# Patient Record
Sex: FEMALE | Race: WHITE | URBAN - METROPOLITAN AREA
[De-identification: names, ages, dates, MRNs, and addresses within clinical notes are randomized per-mention and may not be internally consistent; named-entity substitution may affect disease eponyms.]

---

## 2021-04-27 ENCOUNTER — HOSPITAL ENCOUNTER (OUTPATIENT)
Age: 56
Setting detail: SPECIMEN
Discharge: HOME | End: 2021-04-27
Payer: COMMERCIAL

## 2021-04-28 LAB
ALBUMIN SERPL-MCNC: 4.3 G/DL (ref 3.5–5.2)
ALBUMIN/GLOBULIN RATIO: 1.3 (ref 1–2.5)
ALP BLD-CCNC: 97 U/L (ref 35–104)
ALT SERPL-CCNC: 16 U/L (ref 5–33)
ANION GAP SERPL CALCULATED.3IONS-SCNC: 8 MMOL/L (ref 9–17)
AST SERPL-CCNC: 23 U/L
BILIRUB SERPL-MCNC: 0.68 MG/DL (ref 0.3–1.2)
BUN BLDV-MCNC: 12 MG/DL (ref 6–20)
BUN/CREAT BLD: ABNORMAL (ref 9–20)
CALCIUM SERPL-MCNC: 9.8 MG/DL (ref 8.6–10.4)
CHLORIDE BLD-SCNC: 100 MMOL/L (ref 98–107)
CHOLESTEROL/HDL RATIO: 4.2
CHOLESTEROL: 166 MG/DL
CO2: 27 MMOL/L (ref 20–31)
CREAT SERPL-MCNC: 0.58 MG/DL (ref 0.5–0.9)
GFR AFRICAN AMERICAN: >60 ML/MIN
GFR NON-AFRICAN AMERICAN: >60 ML/MIN
GFR SERPL CREATININE-BSD FRML MDRD: ABNORMAL ML/MIN/{1.73_M2}
GFR SERPL CREATININE-BSD FRML MDRD: ABNORMAL ML/MIN/{1.73_M2}
GLUCOSE BLD-MCNC: 90 MG/DL (ref 70–99)
HDLC SERPL-MCNC: 40 MG/DL
LDL CHOLESTEROL: 103 MG/DL (ref 0–130)
POTASSIUM SERPL-SCNC: 4.5 MMOL/L (ref 3.7–5.3)
SODIUM BLD-SCNC: 135 MMOL/L (ref 135–144)
TOTAL PROTEIN: 7.5 G/DL (ref 6.4–8.3)
TRIGL SERPL-MCNC: 115 MG/DL
VLDLC SERPL CALC-MCNC: ABNORMAL MG/DL (ref 1–30)

## 2022-12-08 ENCOUNTER — APPOINTMENT (OUTPATIENT)
Dept: GENERAL RADIOLOGY | Age: 57
End: 2022-12-08
Payer: COMMERCIAL

## 2022-12-08 ENCOUNTER — HOSPITAL ENCOUNTER (EMERGENCY)
Age: 57
Discharge: HOME | End: 2022-12-08
Attending: EMERGENCY MEDICINE
Payer: COMMERCIAL

## 2022-12-08 VITALS
HEART RATE: 90 BPM | DIASTOLIC BLOOD PRESSURE: 82 MMHG | RESPIRATION RATE: 16 BRPM | SYSTOLIC BLOOD PRESSURE: 156 MMHG | OXYGEN SATURATION: 96 % | WEIGHT: 159 LBS | TEMPERATURE: 99.5 F

## 2022-12-08 DIAGNOSIS — J06.9 UPPER RESPIRATORY TRACT INFECTION, UNSPECIFIED TYPE: ICD-10-CM

## 2022-12-08 DIAGNOSIS — R05.1 ACUTE COUGH: Primary | ICD-10-CM

## 2022-12-08 DIAGNOSIS — J10.1 INFLUENZA A: ICD-10-CM

## 2022-12-08 LAB
ANION GAP SERPL CALCULATED.3IONS-SCNC: 12 MEQ/L (ref 8–16)
BASOPHILS # BLD: 0.1 %
BASOPHILS ABSOLUTE: 0 THOU/MM3 (ref 0–0.1)
BUN BLDV-MCNC: 8 MG/DL (ref 7–22)
CALCIUM SERPL-MCNC: 9.9 MG/DL (ref 8.5–10.5)
CHLORIDE BLD-SCNC: 94 MEQ/L (ref 98–111)
CO2: 25 MEQ/L (ref 23–33)
CREAT SERPL-MCNC: 0.6 MG/DL (ref 0.4–1.2)
EOSINOPHIL # BLD: 0.1 %
EOSINOPHILS ABSOLUTE: 0 THOU/MM3 (ref 0–0.4)
ERYTHROCYTE [DISTWIDTH] IN BLOOD BY AUTOMATED COUNT: 11.9 % (ref 11.5–14.5)
ERYTHROCYTE [DISTWIDTH] IN BLOOD BY AUTOMATED COUNT: 39.4 FL (ref 35–45)
GFR SERPL CREATININE-BSD FRML MDRD: > 60 ML/MIN/1.73M2
GLUCOSE BLD-MCNC: 114 MG/DL (ref 70–108)
HCT VFR BLD CALC: 34.1 % (ref 37–47)
HEMOGLOBIN: 11.8 GM/DL (ref 12–16)
IMMATURE GRANS (ABS): 0.04 THOU/MM3 (ref 0–0.07)
IMMATURE GRANULOCYTES: 0.4 %
INFLUENZA A: DETECTED
INFLUENZA B: NOT DETECTED
LYMPHOCYTES # BLD: 7.2 %
LYMPHOCYTES ABSOLUTE: 0.8 THOU/MM3 (ref 1–4.8)
MCH RBC QN AUTO: 31.6 PG (ref 26–33)
MCHC RBC AUTO-ENTMCNC: 34.6 GM/DL (ref 32.2–35.5)
MCV RBC AUTO: 91.2 FL (ref 81–99)
MONOCYTES # BLD: 7.7 %
MONOCYTES ABSOLUTE: 0.8 THOU/MM3 (ref 0.4–1.3)
NUCLEATED RED BLOOD CELLS: 0 /100 WBC
PLATELET # BLD: 231 THOU/MM3 (ref 130–400)
PMV BLD AUTO: 10.9 FL (ref 9.4–12.4)
POTASSIUM SERPL-SCNC: 3.8 MEQ/L (ref 3.5–5.2)
PROCALCITONIN: 0.07 NG/ML (ref 0.01–0.09)
RBC # BLD: 3.74 MILL/MM3 (ref 4.2–5.4)
SARS-COV-2 RNA, RT PCR: NOT DETECTED
SEG NEUTROPHILS: 84.5 %
SEGMENTED NEUTROPHILS ABSOLUTE COUNT: 9.2 THOU/MM3 (ref 1.8–7.7)
SODIUM BLD-SCNC: 131 MEQ/L (ref 135–145)
WBC # BLD: 10.9 THOU/MM3 (ref 4.8–10.8)

## 2022-12-08 PROCEDURE — 87636 SARSCOV2 & INF A&B AMP PRB: CPT

## 2022-12-08 PROCEDURE — 85025 COMPLETE CBC W/AUTO DIFF WBC: CPT

## 2022-12-08 PROCEDURE — 36415 COLL VENOUS BLD VENIPUNCTURE: CPT

## 2022-12-08 PROCEDURE — 84145 PROCALCITONIN (PCT): CPT

## 2022-12-08 PROCEDURE — 99215 OFFICE O/P EST HI 40 MIN: CPT

## 2022-12-08 PROCEDURE — 80048 BASIC METABOLIC PNL TOTAL CA: CPT

## 2022-12-08 PROCEDURE — 71046 X-RAY EXAM CHEST 2 VIEWS: CPT

## 2022-12-08 PROCEDURE — 99283 EMERGENCY DEPT VISIT LOW MDM: CPT

## 2022-12-08 PROCEDURE — 6370000000 HC RX 637 (ALT 250 FOR IP): Performed by: STUDENT IN AN ORGANIZED HEALTH CARE EDUCATION/TRAINING PROGRAM

## 2022-12-08 PROCEDURE — 99203 OFFICE O/P NEW LOW 30 MIN: CPT | Performed by: NURSE PRACTITIONER

## 2022-12-08 RX ORDER — BENZONATATE 200 MG/1
200 CAPSULE ORAL 3 TIMES DAILY PRN
Qty: 60 CAPSULE | Refills: 0 | Status: SHIPPED | OUTPATIENT
Start: 2022-12-08

## 2022-12-08 RX ORDER — GUAIFENESIN AND DEXTROMETHORPHAN HYDROBROMIDE 1200; 60 MG/1; MG/1
TABLET, EXTENDED RELEASE ORAL
COMMUNITY
End: 2022-12-08 | Stop reason: SDUPTHER

## 2022-12-08 RX ORDER — LORATADINE 10 MG
1 CAPSULE ORAL 2 TIMES DAILY PRN
Qty: 60 CAPSULE | Refills: 0 | Status: SHIPPED | OUTPATIENT
Start: 2022-12-08

## 2022-12-08 RX ORDER — BENZONATATE 100 MG/1
200 CAPSULE ORAL ONCE
Status: COMPLETED | OUTPATIENT
Start: 2022-12-08 | End: 2022-12-08

## 2022-12-08 RX ORDER — LISINOPRIL AND HYDROCHLOROTHIAZIDE 25; 20 MG/1; MG/1
TABLET ORAL
COMMUNITY
Start: 2022-10-16

## 2022-12-08 RX ORDER — 0.9 % SODIUM CHLORIDE 0.9 %
500 INTRAVENOUS SOLUTION INTRAVENOUS ONCE
Status: DISCONTINUED | OUTPATIENT
Start: 2022-12-08 | End: 2022-12-08

## 2022-12-08 RX ORDER — ACETAMINOPHEN 500 MG
1000 TABLET ORAL ONCE
Status: COMPLETED | OUTPATIENT
Start: 2022-12-08 | End: 2022-12-08

## 2022-12-08 RX ADMIN — BENZONATATE 200 MG: 100 CAPSULE ORAL at 22:45

## 2022-12-08 RX ADMIN — ACETAMINOPHEN 1000 MG: 500 TABLET ORAL at 21:19

## 2022-12-08 ASSESSMENT — ENCOUNTER SYMPTOMS
RHINORRHEA: 1
SHORTNESS OF BREATH: 0
NAUSEA: 0
EYES NEGATIVE: 1
CHEST TIGHTNESS: 0
SORE THROAT: 0
DIARRHEA: 0
SINUS PRESSURE: 0
ABDOMINAL PAIN: 0
COUGH: 1
EYE REDNESS: 0
EYE ITCHING: 0
VOMITING: 0

## 2022-12-08 ASSESSMENT — PAIN SCALES - GENERAL: PAINLEVEL_OUTOF10: 7

## 2022-12-08 ASSESSMENT — PAIN DESCRIPTION - DESCRIPTORS: DESCRIPTORS: PRESSURE;SHARP

## 2022-12-08 ASSESSMENT — PAIN DESCRIPTION - LOCATION: LOCATION: HEAD

## 2022-12-08 ASSESSMENT — PAIN DESCRIPTION - PAIN TYPE: TYPE: ACUTE PAIN

## 2022-12-08 ASSESSMENT — PAIN - FUNCTIONAL ASSESSMENT
PAIN_FUNCTIONAL_ASSESSMENT: 0-10
PAIN_FUNCTIONAL_ASSESSMENT: PREVENTS OR INTERFERES SOME ACTIVE ACTIVITIES AND ADLS

## 2022-12-08 ASSESSMENT — PAIN DESCRIPTION - FREQUENCY: FREQUENCY: INTERMITTENT

## 2022-12-08 NOTE — ED TRIAGE NOTES
Pt to room 3 with c/o a harsh cough since before Thanksgiving. She reports she has tested for Covid and is negative but she c/o head pain with the cough.

## 2022-12-08 NOTE — ED PROVIDER NOTES
[address]       Chief Complaint   Patient presents with    Cough    Headache       Nurses Notes reviewed and I agree except as noted in the HPI. HISTORY OF PRESENT ILLNESS   Odin Glasgow is a 61 y.o. female who presents to the urgent care for evaluation of symptoms that started 11/23/22. Has a cough that just will not go away. She has taken \"bottles of cough medication. \"  She is coughing so hard her head hurts. Home COVID test negative. No chronic lung disease. The patient/patient representative has no other acute complaints at this time. REVIEW OF SYSTEMS     Review of Systems   Constitutional:  Negative for chills, fatigue and fever. HENT:  Negative for congestion, ear pain, sinus pressure and sore throat. Eyes:  Negative for redness and itching. Respiratory:  Positive for cough. Negative for chest tightness and shortness of breath. Cardiovascular:  Negative for chest pain. Gastrointestinal:  Negative for abdominal pain, diarrhea, nausea and vomiting. Skin:  Negative for rash. Allergic/Immunologic: Negative for environmental allergies and food allergies. Neurological:  Negative for headaches. Hematological:  Negative for adenopathy. PAST MEDICAL HISTORY         Diagnosis Date    Hypertension        SURGICAL HISTORY     Patient  has no past surgical history on file. CURRENT MEDICATIONS       Previous Medications    DEXTROMETHORPHAN-GUAIFENESIN (MUCINEX DM MAXIMUM STRENGTH)  MG TB12    Take by mouth    LISINOPRIL-HYDROCHLOROTHIAZIDE (PRINZIDE;ZESTORETIC) 20-25 MG PER TABLET    TAKE 1 TABLET BY MOUTH ONCE DAILY       ALLERGIES     Patient is is allergic to pcn [penicillins]. FAMILY HISTORY     Patient'sfamily history is not on file. SOCIAL HISTORY     Patient  reports that she has never smoked. She has never used smokeless tobacco. She reports that she does not currently use alcohol.  She reports that she does not use drugs. PHYSICAL EXAM     ED TRIAGE VITALS  BP: (!) 180/84, Temp: 99.4 °F (37.4 °C), Heart Rate: (!) 104, Resp: 16, SpO2: 95 %  Physical Exam  Vitals and nursing note reviewed. Constitutional:       General: She is not in acute distress. Appearance: Normal appearance. She is well-developed and well-groomed. HENT:      Head: Normocephalic and atraumatic. Right Ear: External ear normal.      Left Ear: External ear normal.      Nose: Nose normal.      Mouth/Throat:      Lips: Pink. Mouth: Mucous membranes are moist.   Eyes:      Conjunctiva/sclera: Conjunctivae normal.      Right eye: Right conjunctiva is not injected. Left eye: Left conjunctiva is not injected. Pupils: Pupils are equal.   Cardiovascular:      Rate and Rhythm: Normal rate. Heart sounds: Normal heart sounds. Pulmonary:      Effort: Pulmonary effort is normal. No respiratory distress. Breath sounds: Decreased breath sounds and wheezing (expiratory) present. Musculoskeletal:      Cervical back: Normal range of motion. Skin:     General: Skin is warm and dry. Findings: No rash (on exposed surfaces). Neurological:      Mental Status: She is alert and oriented to person, place, and time. Gait: Gait is intact. Psychiatric:         Mood and Affect: Mood normal.         Speech: Speech normal.         Behavior: Behavior is cooperative. DIAGNOSTIC RESULTS   Labs:  Abnormal Labs Reviewed - No data to display     IMAGING:  XR CHEST (2 VW)   Final Result   1. Multifocal pulmonary opacities suggestive of a multifocal infectious or    inflammatory process including aspiration pneumonitis. 2. Likely hiatal hernia. This document has been electronically signed by:  Cleo Liao MD on    12/08/2022 06:27 PM        URGENT CARE COURSE:     Vitals:    12/08/22 1713   BP: (!) 180/84   Pulse: (!) 104   Resp: 16   Temp: 99.4 °F (37.4 °C)   TempSrc: Temporal   SpO2: 95%   Weight: 159 lb (72.1 kg)       Medications - No data to display  PROCEDURES:  FINALIMPRESSION      1. Acute cough        DISPOSITION/PLAN   DISPOSITION Decision To Transfer 12/08/2022 06:36:41 PM      Problem List Items Addressed This Visit    None  Visit Diagnoses       Acute cough    -  Primary           After reviewing the patients History of Present illness, Vital Signs,Clinical Findings,Comorbities, and Clinical Data obtained I discussed with the patient or patient representative that there is a very real potential for serious injury / illness and the patient will need to be transfer to a level of higher care for further evaluation and continued care. It was explained that this would provide the best care for the patient. The patient/patient representative are agreeable to the treatment plan and are agreeable to be transferred therefore, the patient will be transferred to [facility] ED for reevaluation and further management . PATIENT REFERRED TO:  [facility], EMERGENCY DEPT  [address] [place] [address]  [phone]      [address] DIRECTLY TO THE EMERGENCY DEPARTMENT, for further evalation    BETTE Mcclain CNP    Please note that some or all of this chart was generated using  [employer] voice recognition software. Although every effort was made to ensure the accuracy of this automated transcription, some errors in transcription may have occurred.         BETTE Mcclain CNP  12/08/22 0397

## 2022-12-09 NOTE — ED PROVIDER NOTES
[facility]          Pt Name: Andry Luis  MRN: [id number]  [place] 1965  Date of evaluation: 12/8/2022  Treating Resident Physician: Koko Mello MD  Supervising Physician: Corina Edward MD    CHIEF COMPLAINT       Chief Complaint   Patient presents with    Cough    Headache           HISTORY OF PRESENT ILLNESS    HPI  Andry Luis is a 61 y.o. female w/ hx of hypertension on lisinopril who presents to the emergency department for evaluation of persistent productive cough since wed before thanksgiving. Patient was convinced by  to go be seen at urgent care today for the cough and was advised to come to ED due to bilateral patchy opacities on CXR for further evaluation. Patient says that she had URI symptoms w/ productive cough right before thanksgiving. She reports that the cough has persisted. Reports fever started yesterday. No issues with swallowing or choking with eating. Denies chest pain. The patient has no other acute complaints at this time. Walking o2 sat stayed above 94%. REVIEW OF SYSTEMS   Review of Systems   Constitutional:  Positive for fever. HENT:  Positive for congestion and rhinorrhea. Eyes: Negative. Respiratory:  Positive for cough. Negative for shortness of breath. Cardiovascular:  Negative for chest pain. Gastrointestinal:  Negative for abdominal pain, diarrhea, nausea and vomiting. Genitourinary: Negative. PAST MEDICAL AND SURGICAL HISTORY     Past Medical History:   Diagnosis Date    Hypertension      History reviewed. No pertinent surgical history. MEDICATIONS   No current facility-administered medications for this encounter.     Current Outpatient Medications:     benzonatate (TESSALON) 200 MG capsule, Take 1 capsule by mouth 3 times daily as needed for Cough, Disp: 60 capsule, Rfl: 0    Dextromethorphan-guaiFENesin (CORICIDIN HBP CONGESTION/COUGH)  MG CAPS, Take 1 capsule by mouth 2 times daily as needed (cough/congestion), Disp: 60 capsule, Rfl: 0    lisinopril-hydroCHLOROthiazide (PRINZIDE;ZESTORETIC) 20-25 MG per tablet, TAKE 1 TABLET BY MOUTH ONCE DAILY, Disp: , Rfl:       SOCIAL HISTORY     Social History     Social History Narrative    Not on file     Social History     Tobacco Use    Smoking status: Never    Smokeless tobacco: Never   Vaping Use    Vaping Use: Never used   Substance Use Topics    Alcohol use: Not Currently    Drug use: Never         ALLERGIES     Allergies   Allergen Reactions    Pcn [Penicillins] Rash         FAMILY HISTORY   History reviewed. No pertinent family history. PHYSICAL EXAM     ED Triage Vitals [12/08/22 1713]   BP Temp Temp Source Heart Rate Resp SpO2 Height Weight   (!) 180/84 99.4 °F (37.4 °C) Temporal (!) 104 16 95 % -- 159 lb (72.1 kg)       Additional Vital Signs:  Vitals:    12/08/22 2239   BP: (!) 156/82   Pulse: 90   Resp: 16   Temp:    SpO2: 96%       Physical Exam:  GENERAL: Alert and oriented. Non toxic-looking. EYES: No erythema, drainage, or icterus. ENT: No ear discharge. No thyromegaly or lymphadenopathy. HEART: Normal S1/S2. No murmurs. LUNGS: Clear to auscultation bilaterally. ABDOMEN: Soft and non-tender. EXTREMITIES: no edema. Gross strength intact. NEUROLOGICAL: Grossly normal.  SKIN: no rashes    MEDICAL DECISION MAKING   Initial Assessment:   62 yo F who presents with URI symptoms and fevers. Reports that she's had a persistent cough since thanksgiving. Had complete resolution of other symptoms before becoming febrile yesterday. Tested positive for Flu A. CXR with bilateral infiltrates and negative procal.     Plan:   Recommend supportive treatment. Will prescribe tessalon and coricidin for cough.       ED RESULTS   Laboratory results:  Labs Reviewed   COVID-19 & INFLUENZA COMBO - Abnormal; Notable for the following components:       Result Value    INFLUENZA A DETECTED (*)     All other components within normal limits   CBC WITH AUTO DIFFERENTIAL - Abnormal; Notable for the following components:    WBC 10.9 (*)     RBC 3.74 (*)     Hemoglobin 11.8 (*)     Hematocrit 34.1 (*)     Segs Absolute 9.2 (*)     Lymphocytes Absolute 0.8 (*)     All other components within normal limits   BASIC METABOLIC PANEL - Abnormal; Notable for the following components:    Sodium 131 (*)     Chloride 94 (*)     Glucose 114 (*)     All other components within normal limits   PROCALCITONIN   ANION GAP   GLOMERULAR FILTRATION RATE, ESTIMATED       Radiologic studies results:  XR CHEST (2 VW)   Final Result   1. Multifocal pulmonary opacities suggestive of a multifocal infectious or    inflammatory process including aspiration pneumonitis. 2. Likely hiatal hernia. This document has been electronically signed by: Janessa Dewey MD on    12/08/2022 06:27 PM          ED Medications administered this visit:   Medications   acetaminophen (TYLENOL) tablet 1,000 mg (1,000 mg Oral Given 12/8/22 2119)   benzonatate (TESSALON) capsule 200 mg (200 mg Oral Given 12/8/22 2245)         ED COURSE      Tylenol given. Tachycardia resolved. O2 sat remained stable at 95-99% while on RA and resting. Walkin go2 sat performed and O2 sat did not fall below 94%. Strict return precautions and follow up instructions were discussed with the patient prior to discharge, with which the patient agrees. Patient agreeable to f/u with PCP in 3-4 days if symptoms do not improve. If cough worsens or if significant changes in sputum, asked patient to return to ED for potential superimposed bacterial infection.       MEDICATION CHANGES     Discharge Medication List as of 12/8/2022 10:43 PM        START taking these medications    Details   benzonatate (TESSALON) 200 MG capsule Take 1 capsule by mouth 3 times daily as needed for Cough, Disp-60 capsule, R-0Normal      Dextromethorphan-guaiFENesin (CORICIDIN HBP CONGESTION/COUGH)  MG CAPS Take 1 capsule by mouth 2 times daily as needed (cough/congestion), Disp-60 capsule, R-0Normal               FINAL DISPOSITION     Final diagnoses:   Acute cough   Influenza A   Upper respiratory tract infection, unspecified type     Condition: condition: stable  Dispo: Discharge to home      This transcription was electronically signed. Parts of this transcriptions may have been dictated by use of voice recognition software and electronically transcribed, and parts may have been transcribed with the assistance of an ED scribe. The transcription may contain errors not detected in proofreading. Please refer to my supervising physician's documentation if my documentation differs.     Electronically Signed: Koko Mello MD, 12/08/22, 10:50 PM        Koko Mello MD  Resident  12/08/22 5647

## 2022-12-09 NOTE — ED PROVIDER NOTES
PATIENT NAME: Rianna Orozco  MRN: [id number]  : 1965  BEDOLLA: 2022    I performed a history and physical examination of the patient and discussed management with the Resident. I reviewed the Resident's note and agree with the documented findings and plan of care. Any areas of disagreement are noted on the chart. I was personally present for the key portions of any procedures and have documented in the chart those procedures where I was not present during the key portions. I have reviewed the emergency nurses triage note and agree with the chief complaint, past medical history, past surgical history, allergies, medications, social and family history as documented unless otherwise noted below.     MEDICAL DEDISION MAKING (MDM)     Rianna Orozco is a 61 y.o. female who presents to Emergency Department with Cough and Headache     Vitals:    229   BP: (!) 168/88 (!) 169/88 (!) 158/79 (!) 156/82   Pulse:  (!) 105 (!) 111 90   Resp:  16 16 16   Temp:       TempSrc:       SpO2:  92% 98% 96%   Weight:         Medications   acetaminophen (TYLENOL) tablet 1,000 mg (1,000 mg Oral Given 22)   benzonatate (TESSALON) capsule 200 mg (200 mg Oral Given 22)     Labs Reviewed   COVID-19 & INFLUENZA COMBO - Abnormal; Notable for the following components:       Result Value    INFLUENZA A DETECTED (*)     All other components within normal limits   CBC WITH AUTO DIFFERENTIAL - Abnormal; Notable for the following components:    WBC 10.9 (*)     RBC 3.74 (*)     Hemoglobin 11.8 (*)     Hematocrit 34.1 (*)     Segs Absolute 9.2 (*)     Lymphocytes Absolute 0.8 (*)     All other components within normal limits   BASIC METABOLIC PANEL - Abnormal; Notable for the following components:    Sodium 131 (*)     Chloride 94 (*)     Glucose 114 (*)     All other components within normal limits   PROCALCITONIN   ANION GAP   GLOMERULAR FILTRATION RATE, ESTIMATED     XR CHEST (2 VW)   Final Result   1. Multifocal pulmonary opacities suggestive of a multifocal infectious or    inflammatory process including aspiration pneumonitis. 2. Likely hiatal hernia. This document has been electronically signed by: Jay Ricks MD on    12/08/2022 06:27 PM          FINAL IMPRESSION AND DISPOSITION      1. Acute cough    2. Influenza A    3. Upper respiratory tract infection, unspecified type        DISPOSITION Decision To Discharge 12/08/2022 10:41:28 PM      PATIENT REFERRED TO:  No follow-up provider specified.     DISCHARGE MEDICATIONS:  Discharge Medication List as of 12/8/2022 10:43 PM        START taking these medications    Details   benzonatate (TESSALON) 200 MG capsule Take 1 capsule by mouth 3 times daily as needed for Cough, Disp-60 capsule, R-0Normal      Dextromethorphan-guaiFENesin (CORICIDIN HBP CONGESTION/COUGH)  MG CAPS Take 1 capsule by mouth 2 times daily as needed (cough/congestion), Disp-60 capsule, R-0Normal             (Please note that portions of this note were completed with a voice recognition program.  Efforts were made to edit the dictations but occasionally words aremis-transcribed.)    MD Mell Becker MD  12/09/22 2433

## 2023-12-11 ENCOUNTER — OFFICE VISIT (OUTPATIENT)
Dept: FAMILY MEDICINE CLINIC | Age: 58
End: 2023-12-11
Payer: COMMERCIAL

## 2023-12-11 ENCOUNTER — TELEPHONE (OUTPATIENT)
Dept: FAMILY MEDICINE CLINIC | Age: 58
End: 2023-12-11

## 2023-12-11 VITALS
RESPIRATION RATE: 16 BRPM | SYSTOLIC BLOOD PRESSURE: 186 MMHG | WEIGHT: 162.1 LBS | HEIGHT: 62 IN | BODY MASS INDEX: 29.83 KG/M2 | HEART RATE: 92 BPM | DIASTOLIC BLOOD PRESSURE: 98 MMHG

## 2023-12-11 DIAGNOSIS — F43.20 ANTICIPATORY GRIEVING: ICD-10-CM

## 2023-12-11 DIAGNOSIS — Z00.00 WELL ADULT EXAM: Primary | ICD-10-CM

## 2023-12-11 DIAGNOSIS — I10 PRIMARY HYPERTENSION: ICD-10-CM

## 2023-12-11 PROCEDURE — 3077F SYST BP >= 140 MM HG: CPT | Performed by: NURSE PRACTITIONER

## 2023-12-11 PROCEDURE — 3080F DIAST BP >= 90 MM HG: CPT | Performed by: NURSE PRACTITIONER

## 2023-12-11 PROCEDURE — 99386 PREV VISIT NEW AGE 40-64: CPT | Performed by: NURSE PRACTITIONER

## 2023-12-11 RX ORDER — LISINOPRIL AND HYDROCHLOROTHIAZIDE 25; 20 MG/1; MG/1
1 TABLET ORAL DAILY
Qty: 90 TABLET | Refills: 3 | Status: SHIPPED | OUTPATIENT
Start: 2023-12-11

## 2023-12-11 SDOH — ECONOMIC STABILITY: HOUSING INSECURITY
IN THE LAST 12 MONTHS, WAS THERE A TIME WHEN YOU DID NOT HAVE A STEADY PLACE TO SLEEP OR SLEPT IN A SHELTER (INCLUDING NOW)?: NO

## 2023-12-11 SDOH — ECONOMIC STABILITY: INCOME INSECURITY: HOW HARD IS IT FOR YOU TO PAY FOR THE VERY BASICS LIKE FOOD, HOUSING, MEDICAL CARE, AND HEATING?: NOT HARD AT ALL

## 2023-12-11 SDOH — ECONOMIC STABILITY: FOOD INSECURITY: WITHIN THE PAST 12 MONTHS, THE FOOD YOU BOUGHT JUST DIDN'T LAST AND YOU DIDN'T HAVE MONEY TO GET MORE.: NEVER TRUE

## 2023-12-11 SDOH — ECONOMIC STABILITY: FOOD INSECURITY: WITHIN THE PAST 12 MONTHS, YOU WORRIED THAT YOUR FOOD WOULD RUN OUT BEFORE YOU GOT MONEY TO BUY MORE.: NEVER TRUE

## 2023-12-11 ASSESSMENT — PATIENT HEALTH QUESTIONNAIRE - PHQ9
SUM OF ALL RESPONSES TO PHQ QUESTIONS 1-9: 1
1. LITTLE INTEREST OR PLEASURE IN DOING THINGS: 0
SUM OF ALL RESPONSES TO PHQ9 QUESTIONS 1 & 2: 1
2. FEELING DOWN, DEPRESSED OR HOPELESS: 1
SUM OF ALL RESPONSES TO PHQ QUESTIONS 1-9: 1

## 2023-12-11 ASSESSMENT — ENCOUNTER SYMPTOMS
SHORTNESS OF BREATH: 0
COUGH: 0
ABDOMINAL PAIN: 0
NAUSEA: 0

## 2023-12-11 NOTE — PROGRESS NOTES
Subjective:      Patient ID: Shanita Corley 1965 is a 60 y.o. female here for evaluation. NP to establish care. Former PCP was  None    Past Medical History:   Diagnosis Date    Hypertension        No past surgical history on file. Family History   Problem Relation Age of Onset    Asthma Mother     Parkinson's Disease Father     No Known Problems Brother        Current Outpatient Medications   Medication Sig Dispense Refill    lisinopril-hydroCHLOROthiazide (PRINZIDE;ZESTORETIC) 20-25 MG per tablet Take 1 tablet by mouth daily 90 tablet 3     No current facility-administered medications for this visit. Smoke: None    Chief Complaint   Patient presents with    New Patient     No previous PCP \"just floated around to different urgent cares\"    Medication Refill     Pt has been getting her bp meds from [facility] Ign[facility], last seen her 3/2023. Would like to have PCP take over the bp monitoring and refills     Colon Cancer Screening - None  Breast Cancer Screening - None    Denies CP, SOB or chest tightness    1 month since father passed away. Was very close. Unique relationship. Will have more good days then bad days. Still function well and working. Taking care of her mother still. Vitals:    12/11/23 1422 12/11/23 1426   BP: (!) 194/96 (!) 186/98   Site: Left Upper Arm Left Upper Arm   Position: Sitting Sitting   Cuff Size: Medium Adult Medium Adult   Pulse: 92    Resp: 16    Weight: 73.5 kg (162 lb 1.6 oz)    Height: 1.575 m (5' 2\")         On Prinzide 20-25. BP wnl at home. Checks regular.      BP Readings from Last 3 Encounters:   12/11/23 (!) 186/98   12/08/22 (!) 156/82       [facility] in Spring 2023    No results found for: \"LABA1C\"  No results found for: \"EAG\"    No components found for: \"CHLPL\"  Lab Results   Component Value Date    TRIG 115 04/27/2021    TRIG 93 03/07/2019     Lab Results   Component Value Date    HDL 40 (L) 04/27/2021    HDL 40 03/07/2019     Lab Results

## 2023-12-11 NOTE — TELEPHONE ENCOUNTER
Pt was seen as a NP today, says she saw Dr. Camilo Duran in the spring 2023 and had labs done. I called that office, spoke with Rohan who says pt was last seen in their office 4/2022 but no labs were done. Last labs with them were in 4/2021. She will fax what she has.

## 2023-12-26 ENCOUNTER — NURSE ONLY (OUTPATIENT)
Dept: LAB | Age: 58
End: 2023-12-26

## 2023-12-26 DIAGNOSIS — Z00.00 WELL ADULT EXAM: ICD-10-CM

## 2023-12-26 LAB
ALBUMIN SERPL BCG-MCNC: 4.1 G/DL (ref 3.5–5.1)
ALP SERPL-CCNC: 123 U/L (ref 38–126)
ALT SERPL W/O P-5'-P-CCNC: 15 U/L (ref 11–66)
ANION GAP SERPL CALC-SCNC: 11 MEQ/L (ref 8–16)
AST SERPL-CCNC: 17 U/L (ref 5–40)
BILIRUB SERPL-MCNC: 0.6 MG/DL (ref 0.3–1.2)
BUN SERPL-MCNC: 13 MG/DL (ref 7–22)
CALCIUM SERPL-MCNC: 10.4 MG/DL (ref 8.5–10.5)
CHLORIDE SERPL-SCNC: 102 MEQ/L (ref 98–111)
CHOLEST SERPL-MCNC: 166 MG/DL (ref 100–199)
CO2 SERPL-SCNC: 27 MEQ/L (ref 23–33)
CREAT SERPL-MCNC: 0.6 MG/DL (ref 0.4–1.2)
DEPRECATED MEAN GLUCOSE BLD GHB EST-ACNC: 96 MG/DL (ref 70–126)
DEPRECATED RDW RBC AUTO: 42 FL (ref 35–45)
ERYTHROCYTE [DISTWIDTH] IN BLOOD BY AUTOMATED COUNT: 12 % (ref 11.5–14.5)
GFR SERPL CREATININE-BSD FRML MDRD: > 60 ML/MIN/1.73M2
GLUCOSE SERPL-MCNC: 97 MG/DL (ref 70–108)
HBA1C MFR BLD HPLC: 5.2 % (ref 4.4–6.4)
HCT VFR BLD AUTO: 37.4 % (ref 37–47)
HDLC SERPL-MCNC: 37 MG/DL
HGB BLD-MCNC: 12.4 GM/DL (ref 12–16)
LDLC SERPL CALC-MCNC: 106 MG/DL
MCH RBC QN AUTO: 31.7 PG (ref 26–33)
MCHC RBC AUTO-ENTMCNC: 33.2 GM/DL (ref 32.2–35.5)
MCV RBC AUTO: 95.7 FL (ref 81–99)
PLATELET # BLD AUTO: 208 THOU/MM3 (ref 130–400)
PMV BLD AUTO: 11.6 FL (ref 9.4–12.4)
POTASSIUM SERPL-SCNC: 4.2 MEQ/L (ref 3.5–5.2)
PROT SERPL-MCNC: 7.4 G/DL (ref 6.1–8)
RBC # BLD AUTO: 3.91 MILL/MM3 (ref 4.2–5.4)
SODIUM SERPL-SCNC: 140 MEQ/L (ref 135–145)
TRIGL SERPL-MCNC: 116 MG/DL (ref 0–199)
TSH SERPL DL<=0.005 MIU/L-ACNC: 0.83 UIU/ML (ref 0.4–4.2)
WBC # BLD AUTO: 5.2 THOU/MM3 (ref 4.8–10.8)

## 2024-10-01 ENCOUNTER — COMMUNITY OUTREACH (OUTPATIENT)
Dept: FAMILY MEDICINE CLINIC | Age: 59
End: 2024-10-01

## 2024-12-07 DIAGNOSIS — I10 PRIMARY HYPERTENSION: ICD-10-CM

## 2024-12-09 RX ORDER — LISINOPRIL AND HYDROCHLOROTHIAZIDE 20; 25 MG/1; MG/1
1 TABLET ORAL DAILY
Qty: 90 TABLET | Refills: 3 | Status: SHIPPED | OUTPATIENT
Start: 2024-12-09

## 2024-12-09 NOTE — TELEPHONE ENCOUNTER
Attempted to schedule patient. No answer and no vm available.     My chart message sent to patient.

## 2024-12-23 ENCOUNTER — OFFICE VISIT (OUTPATIENT)
Dept: FAMILY MEDICINE CLINIC | Age: 59
End: 2024-12-23

## 2024-12-23 VITALS
SYSTOLIC BLOOD PRESSURE: 158 MMHG | WEIGHT: 157 LBS | DIASTOLIC BLOOD PRESSURE: 70 MMHG | RESPIRATION RATE: 16 BRPM | HEART RATE: 84 BPM | HEIGHT: 62 IN | BODY MASS INDEX: 28.89 KG/M2

## 2024-12-23 DIAGNOSIS — Z00.00 WELL ADULT EXAM: Primary | ICD-10-CM

## 2024-12-23 DIAGNOSIS — I10 WHITE COAT SYNDROME WITH HYPERTENSION: ICD-10-CM

## 2024-12-23 DIAGNOSIS — I10 PRIMARY HYPERTENSION: ICD-10-CM

## 2024-12-23 RX ORDER — LISINOPRIL AND HYDROCHLOROTHIAZIDE 20; 25 MG/1; MG/1
1 TABLET ORAL DAILY
Qty: 90 TABLET | Refills: 3 | Status: SHIPPED | OUTPATIENT
Start: 2024-12-23

## 2024-12-23 SDOH — ECONOMIC STABILITY: FOOD INSECURITY: WITHIN THE PAST 12 MONTHS, YOU WORRIED THAT YOUR FOOD WOULD RUN OUT BEFORE YOU GOT MONEY TO BUY MORE.: NEVER TRUE

## 2024-12-23 SDOH — ECONOMIC STABILITY: INCOME INSECURITY: HOW HARD IS IT FOR YOU TO PAY FOR THE VERY BASICS LIKE FOOD, HOUSING, MEDICAL CARE, AND HEATING?: NOT VERY HARD

## 2024-12-23 SDOH — ECONOMIC STABILITY: FOOD INSECURITY: WITHIN THE PAST 12 MONTHS, THE FOOD YOU BOUGHT JUST DIDN'T LAST AND YOU DIDN'T HAVE MONEY TO GET MORE.: NEVER TRUE

## 2024-12-23 ASSESSMENT — PATIENT HEALTH QUESTIONNAIRE - PHQ9
SUM OF ALL RESPONSES TO PHQ QUESTIONS 1-9: 0
2. FEELING DOWN, DEPRESSED OR HOPELESS: NOT AT ALL
1. LITTLE INTEREST OR PLEASURE IN DOING THINGS: NOT AT ALL
SUM OF ALL RESPONSES TO PHQ9 QUESTIONS 1 & 2: 0

## 2024-12-23 ASSESSMENT — ENCOUNTER SYMPTOMS
SHORTNESS OF BREATH: 0
NAUSEA: 0
ABDOMINAL PAIN: 0
COUGH: 0

## 2024-12-23 NOTE — PROGRESS NOTES
20-25 MG per tablet      3. White coat syndrome with hypertension                Plan:      Continue monitor BP at home  Refills as above  [facility] ordered  Preventative Testing and immunizations discussed - denied  Healthy Lifestyles discussed  RTO in 1 year    Current Outpatient Medications   Medication Sig Dispense Refill    lisinopril-hydroCHLOROthiazide (PRINZIDE;ZESTORETIC) 20-25 MG per tablet Take 1 tablet by mouth daily 90 tablet 3     No current facility-administered medications for this visit.

## 2025-01-05 LAB
ALBUMIN: 4.6 G/DL (ref 3.5–5.2)
ALK PHOSPHATASE: 107 U/L (ref 30–121)
ALT SERPL-CCNC: 19 U/L (ref 5–33)
ANION GAP SERPL CALCULATED.3IONS-SCNC: 10 MMOL/L (ref 7–16)
AST SERPL-CCNC: 19 U/L (ref 9–40)
BASOPHILS ABSOLUTE: 0.02 K/UL (ref 0–0.2)
BASOPHILS RELATIVE PERCENT: 0.3 % (ref 0–2)
BILIRUB SERPL-MCNC: 0.7 MG/DL
BUN BLDV-MCNC: 12 MG/DL (ref 6–20)
CALCIUM SERPL-MCNC: 11.2 MG/DL (ref 8.6–10.5)
CHLORIDE BLD-SCNC: 101 MMOL/L (ref 96–107)
CHOLESTEROL, TOTAL: 167 MG/DL (ref 100–199)
CHOLESTEROL/HDL RATIO: 4.4 (ref 2–4.5)
CO2: 27 MMOL/L (ref 18–32)
CREAT SERPL-MCNC: 0.7 MG/DL (ref 0.51–1.15)
EGFR IF NONAFRICAN AMERICAN: 99 ML/MIN/1.73M2
EOSINOPHILS ABSOLUTE: 0.08 K/UL (ref 0–0.8)
EOSINOPHILS RELATIVE PERCENT: 1.2 % (ref 0–5)
ESTIMATED AVERAGE GLUCOSE: 111 MG/DL
GLUCOSE: 98 MG/DL (ref 70–100)
HBA1C MFR BLD: 5.5 %
HCT VFR BLD CALC: 37.9 % (ref 35–47)
HDLC SERPL-MCNC: 38 MG/DL
HEMOGLOBIN: 12.7 G/DL (ref 11.9–16)
IMMATURE GRANS (ABS): 0.03 K/UL (ref 0–0.06)
IMMATURE GRANULOCYTES %: 0.5 % (ref 0–2)
LDL CHOLESTEROL: 108 MG/DL
LDL/HDL RATIO: 2.8
LYMPHOCYTES ABSOLUTE: 1.28 K/UL (ref 0.9–5.2)
LYMPHOCYTES RELATIVE PERCENT: 19.3 % (ref 20–45)
MCH RBC QN AUTO: 30.8 PG (ref 26–33)
MCHC RBC AUTO-ENTMCNC: 33.5 G/DL (ref 32–35)
MCV RBC AUTO: 92 FL (ref 75–100)
MONOCYTES ABSOLUTE: 0.4 K/UL (ref 0.1–1)
MONOCYTES RELATIVE PERCENT: 6 % (ref 0–13)
NEUTROPHILS ABSOLUTE: 4.81 K/UL (ref 1.9–8)
NEUTROPHILS RELATIVE PERCENT: 72.7 % (ref 45–75)
PDW BLD-RTO: 11.8 % (ref 11.2–14.8)
PLATELET # BLD: 236 THOUS/CMM (ref 140–440)
POTASSIUM SERPL-SCNC: 4.8 MMOL/L (ref 3.5–5.4)
RBC # BLD: 4.12 MILL/CMM (ref 3.8–5.2)
SODIUM BLD-SCNC: 138 MMOL/L (ref 135–148)
TOTAL PROTEIN: 7.7 G/DL (ref 6–8.3)
TRIGL SERPL-MCNC: 106 MG/DL (ref 20–149)
TSH SERPL DL<=0.05 MIU/L-ACNC: 0.55 UIU/ML (ref 0.27–4.2)
VLDLC SERPL CALC-MCNC: 21 MG/DL
WBC # BLD: 6.6 THDS/CMM (ref 3.6–11)

## 2025-01-06 DIAGNOSIS — E83.52 HYPERCALCEMIA: Primary | ICD-10-CM

## 2025-02-08 ENCOUNTER — LAB (OUTPATIENT)
Dept: LAB | Age: 60
End: 2025-02-08

## 2025-02-08 DIAGNOSIS — E83.52 HYPERCALCEMIA: ICD-10-CM

## 2025-02-08 LAB — CALCIUM SERPL-MCNC: 10.2 MG/DL (ref 8.5–10.5)

## 2025-04-23 ENCOUNTER — COMMUNITY OUTREACH (OUTPATIENT)
Dept: FAMILY MEDICINE CLINIC | Age: 60
End: 2025-04-23

## 2025-07-07 ENCOUNTER — TELEPHONE (OUTPATIENT)
Dept: FAMILY MEDICINE CLINIC | Age: 60
End: 2025-07-07

## 2025-07-07 ENCOUNTER — TELEMEDICINE (OUTPATIENT)
Dept: FAMILY MEDICINE CLINIC | Age: 60
End: 2025-07-07

## 2025-07-07 DIAGNOSIS — N39.0 ACUTE UTI: Primary | ICD-10-CM

## 2025-07-07 PROCEDURE — 99213 OFFICE O/P EST LOW 20 MIN: CPT | Performed by: NURSE PRACTITIONER

## 2025-07-07 RX ORDER — CIPROFLOXACIN 250 MG/1
250 TABLET, FILM COATED ORAL 2 TIMES DAILY
Qty: 14 TABLET | Refills: 0 | Status: SHIPPED | OUTPATIENT
Start: 2025-07-07 | End: 2025-07-14

## 2025-07-07 ASSESSMENT — ENCOUNTER SYMPTOMS
ABDOMINAL PAIN: 0
NAUSEA: 0
COUGH: 0
BACK PAIN: 1
SHORTNESS OF BREATH: 0

## 2025-07-07 NOTE — TELEPHONE ENCOUNTER
Pt called office stating she just got back from vacation. The past 3days she has c/o dysuria and urinary frequency. She is taking AZO with some relief \"but not 100% better\".

## 2025-07-07 NOTE — PROGRESS NOTES
Subjective:      Patient ID: Elenita Ames is a 60 y.o. female    HPI: Acute for UTI    TELEHEALTH EVALUATION -- Audio/Visual     Patient identification was verified at the start of the visit: Yes    Services were provided through a video synchronous discussion virtually to substitute for in-person clinic visit. Patient and provider were located at their individual homes.    Patient has requested an audio/video evaluation for the following concern(s):    Chief Complaint   Patient presents with    Urinary Tract Infection     The past 3days she has c/o dysuria and urinary frequency.  Low back pain.  Chills off and on. She is taking AZO with some relief \"but not 100% better\".     There is no problem list on file for this patient.      Review of Systems   Constitutional:  Positive for chills. Negative for fever.   HENT: Negative.     Respiratory:  Negative for cough and shortness of breath.    Cardiovascular:  Negative for chest pain.   Gastrointestinal:  Negative for abdominal pain and nausea.   Genitourinary:  Positive for dysuria, frequency and urgency. Negative for flank pain.   Musculoskeletal:  Positive for back pain.   Skin:  Negative for rash.   Neurological:  Negative for dizziness, light-headedness and headaches.   Psychiatric/Behavioral: Negative.         Objective:   Physical Exam  Constitutional:       General: She is not in acute distress.     Appearance: She is not ill-appearing.   Pulmonary:      Effort: Pulmonary effort is normal. No respiratory distress.   Neurological:      Mental Status: She is alert and oriented to person, place, and time.   Psychiatric:         Mood and Affect: Mood normal.         Behavior: Behavior normal.         Assessment:       Diagnosis Orders   1. Acute UTI  ciprofloxacin (CIPRO) 250 MG tablet          Plan:     Orders as above   Fluids and rest  RTO if symptoms worsen or stay the same          Elenita Ames was evaluated through a synchronous (real-time) audio-video